# Patient Record
Sex: FEMALE | Race: WHITE | HISPANIC OR LATINO | Employment: FULL TIME | URBAN - METROPOLITAN AREA
[De-identification: names, ages, dates, MRNs, and addresses within clinical notes are randomized per-mention and may not be internally consistent; named-entity substitution may affect disease eponyms.]

---

## 2023-10-23 ENCOUNTER — LAB (OUTPATIENT)
Dept: LAB | Facility: HOSPITAL | Age: 23
End: 2023-10-23
Payer: COMMERCIAL

## 2023-10-23 ENCOUNTER — TRANSCRIBE ORDERS (OUTPATIENT)
Dept: LAB | Facility: HOSPITAL | Age: 23
End: 2023-10-23
Payer: COMMERCIAL

## 2023-10-23 DIAGNOSIS — Z34.81 PRENATAL CARE, SUBSEQUENT PREGNANCY, FIRST TRIMESTER: ICD-10-CM

## 2023-10-23 DIAGNOSIS — Z3A.12 12 WEEKS GESTATION OF PREGNANCY: ICD-10-CM

## 2023-10-23 DIAGNOSIS — Z3A.12 12 WEEKS GESTATION OF PREGNANCY: Primary | ICD-10-CM

## 2023-10-23 LAB
ABO GROUP BLD: NORMAL
AMPHET+METHAMPHET UR QL: NEGATIVE
AMPHETAMINES UR QL: NEGATIVE
BARBITURATES UR QL SCN: NEGATIVE
BENZODIAZ UR QL SCN: NEGATIVE
BILIRUB UR QL STRIP: NEGATIVE
BLD GP AB SCN SERPL QL: NEGATIVE
BUPRENORPHINE SERPL-MCNC: NEGATIVE NG/ML
CANNABINOIDS SERPL QL: NEGATIVE
CLARITY UR: CLEAR
COCAINE UR QL: NEGATIVE
COLOR UR: YELLOW
DEPRECATED RDW RBC AUTO: 47.8 FL (ref 37–54)
ERYTHROCYTE [DISTWIDTH] IN BLOOD BY AUTOMATED COUNT: 14.1 % (ref 12.3–15.4)
FENTANYL UR-MCNC: NEGATIVE NG/ML
GLUCOSE UR STRIP-MCNC: NEGATIVE MG/DL
HCT VFR BLD AUTO: 41.3 % (ref 34–46.6)
HGB BLD-MCNC: 13.8 G/DL (ref 12–15.9)
HGB UR QL STRIP.AUTO: NEGATIVE
HOLD SPECIMEN: NORMAL
KETONES UR QL STRIP: NEGATIVE
LEUKOCYTE ESTERASE UR QL STRIP.AUTO: NEGATIVE
MCH RBC QN AUTO: 31.1 PG (ref 26.6–33)
MCHC RBC AUTO-ENTMCNC: 33.4 G/DL (ref 31.5–35.7)
MCV RBC AUTO: 93 FL (ref 79–97)
METHADONE UR QL SCN: NEGATIVE
NITRITE UR QL STRIP: NEGATIVE
OPIATES UR QL: NEGATIVE
OXYCODONE UR QL SCN: NEGATIVE
PCP UR QL SCN: NEGATIVE
PH UR STRIP.AUTO: 6.5 [PH] (ref 5–8)
PLATELET # BLD AUTO: 250 10*3/MM3 (ref 140–450)
PMV BLD AUTO: 10 FL (ref 6–12)
PROPOXYPH UR QL: NEGATIVE
PROT UR QL STRIP: NEGATIVE
RBC # BLD AUTO: 4.44 10*6/MM3 (ref 3.77–5.28)
RH BLD: POSITIVE
SP GR UR STRIP: 1.03 (ref 1–1.03)
TRICYCLICS UR QL SCN: NEGATIVE
UROBILINOGEN UR QL STRIP: NORMAL
WBC NRBC COR # BLD: 7.58 10*3/MM3 (ref 3.4–10.8)

## 2023-10-23 PROCEDURE — 86850 RBC ANTIBODY SCREEN: CPT

## 2023-10-23 PROCEDURE — 36415 COLL VENOUS BLD VENIPUNCTURE: CPT

## 2023-10-23 PROCEDURE — 87086 URINE CULTURE/COLONY COUNT: CPT

## 2023-10-23 PROCEDURE — 86787 VARICELLA-ZOSTER ANTIBODY: CPT

## 2023-10-23 PROCEDURE — 86780 TREPONEMA PALLIDUM: CPT

## 2023-10-23 PROCEDURE — 80307 DRUG TEST PRSMV CHEM ANLYZR: CPT

## 2023-10-23 PROCEDURE — 86762 RUBELLA ANTIBODY: CPT

## 2023-10-23 PROCEDURE — 87491 CHLMYD TRACH DNA AMP PROBE: CPT

## 2023-10-23 PROCEDURE — 86900 BLOOD TYPING SEROLOGIC ABO: CPT

## 2023-10-23 PROCEDURE — 86803 HEPATITIS C AB TEST: CPT

## 2023-10-23 PROCEDURE — 87340 HEPATITIS B SURFACE AG IA: CPT

## 2023-10-23 PROCEDURE — 85027 COMPLETE CBC AUTOMATED: CPT | Performed by: ADVANCED PRACTICE MIDWIFE

## 2023-10-23 PROCEDURE — G0432 EIA HIV-1/HIV-2 SCREEN: HCPCS

## 2023-10-23 PROCEDURE — 87591 N.GONORRHOEAE DNA AMP PROB: CPT

## 2023-10-23 PROCEDURE — 81003 URINALYSIS AUTO W/O SCOPE: CPT

## 2023-10-23 PROCEDURE — 86901 BLOOD TYPING SEROLOGIC RH(D): CPT

## 2023-10-24 LAB
BACTERIA SPEC AEROBE CULT: NO GROWTH
HBV SURFACE AG SERPL QL IA: NORMAL
HCV AB SER DONR QL: NORMAL
HIV 1+2 AB+HIV1 P24 AG SERPL QL IA: NORMAL

## 2023-10-25 LAB
RUBV IGG SERPL IA-ACNC: 2.04 INDEX
TREPONEMA PALLIDUM IGG+IGM AB [PRESENCE] IN SERUM OR PLASMA BY IMMUNOASSAY: NON REACTIVE
VZV IGG SER IA-ACNC: 299 INDEX

## 2023-10-28 LAB
C TRACH RRNA SPEC QL NAA+PROBE: NEGATIVE
N GONORRHOEA RRNA SPEC QL NAA+PROBE: NEGATIVE

## 2024-02-12 ENCOUNTER — TRANSCRIBE ORDERS (OUTPATIENT)
Dept: LAB | Facility: HOSPITAL | Age: 24
End: 2024-02-12
Payer: COMMERCIAL

## 2024-02-12 ENCOUNTER — LAB (OUTPATIENT)
Dept: LAB | Facility: HOSPITAL | Age: 24
End: 2024-02-12
Payer: COMMERCIAL

## 2024-02-12 DIAGNOSIS — Z34.83 PRENATAL CARE, SUBSEQUENT PREGNANCY, THIRD TRIMESTER: ICD-10-CM

## 2024-02-12 DIAGNOSIS — Z3A.28 28 WEEKS GESTATION OF PREGNANCY: ICD-10-CM

## 2024-02-12 DIAGNOSIS — Z3A.28 28 WEEKS GESTATION OF PREGNANCY: Primary | ICD-10-CM

## 2024-02-12 LAB
BLD GP AB SCN SERPL QL: NEGATIVE
DEPRECATED RDW RBC AUTO: 43.6 FL (ref 37–54)
ERYTHROCYTE [DISTWIDTH] IN BLOOD BY AUTOMATED COUNT: 13.2 % (ref 12.3–15.4)
GLUCOSE 1H P 100 G GLC PO SERPL-MCNC: 106 MG/DL (ref 65–139)
HCT VFR BLD AUTO: 35.8 % (ref 34–46.6)
HGB BLD-MCNC: 11.6 G/DL (ref 12–15.9)
MCH RBC QN AUTO: 29.4 PG (ref 26.6–33)
MCHC RBC AUTO-ENTMCNC: 32.4 G/DL (ref 31.5–35.7)
MCV RBC AUTO: 90.6 FL (ref 79–97)
PLATELET # BLD AUTO: 318 10*3/MM3 (ref 140–450)
PMV BLD AUTO: 9.3 FL (ref 6–12)
RBC # BLD AUTO: 3.95 10*6/MM3 (ref 3.77–5.28)
WBC NRBC COR # BLD AUTO: 8.21 10*3/MM3 (ref 3.4–10.8)

## 2024-02-12 PROCEDURE — 82948 REAGENT STRIP/BLOOD GLUCOSE: CPT | Performed by: ADVANCED PRACTICE MIDWIFE

## 2024-02-12 PROCEDURE — 36415 COLL VENOUS BLD VENIPUNCTURE: CPT

## 2024-02-12 PROCEDURE — 82950 GLUCOSE TEST: CPT

## 2024-02-12 PROCEDURE — 85027 COMPLETE CBC AUTOMATED: CPT | Performed by: ADVANCED PRACTICE MIDWIFE

## 2024-02-12 PROCEDURE — 86850 RBC ANTIBODY SCREEN: CPT

## 2024-03-29 ENCOUNTER — HOSPITAL ENCOUNTER (OUTPATIENT)
Facility: HOSPITAL | Age: 24
Discharge: HOME | End: 2024-03-29
Attending: ADVANCED PRACTICE MIDWIFE | Admitting: OBSTETRICS & GYNECOLOGY
Payer: COMMERCIAL

## 2024-03-29 VITALS
DIASTOLIC BLOOD PRESSURE: 62 MMHG | SYSTOLIC BLOOD PRESSURE: 100 MMHG | HEART RATE: 88 BPM | TEMPERATURE: 98.5 F | HEIGHT: 61 IN | RESPIRATION RATE: 18 BRPM | WEIGHT: 138 LBS | BODY MASS INDEX: 26.06 KG/M2

## 2024-03-29 PROBLEM — O47.00 PRETERM UTERINE CONTRACTIONS, ANTEPARTUM: Status: ACTIVE | Noted: 2024-03-29

## 2024-03-29 PROBLEM — N93.9 VAGINAL BLEEDING: Status: ACTIVE | Noted: 2024-03-29

## 2024-03-29 LAB
ALP SERPL-CCNC: 164 U/L (ref 39–117)
ALT SERPL W P-5'-P-CCNC: 7 U/L (ref 1–33)
APTT PPP: 28.3 SECONDS (ref 22–39)
AST SERPL-CCNC: 16 U/L (ref 1–32)
BILIRUB SERPL-MCNC: 0.7 MG/DL (ref 0–1.2)
BILIRUB UR QL STRIP: NEGATIVE
CLARITY UR: CLEAR
COLOR UR: YELLOW
CREAT SERPL-MCNC: 0.43 MG/DL (ref 0.57–1)
DEPRECATED RDW RBC AUTO: 44 FL (ref 37–54)
ERYTHROCYTE [DISTWIDTH] IN BLOOD BY AUTOMATED COUNT: 15.3 % (ref 12.3–15.4)
FIBRINOGEN PPP-MCNC: 400 MG/DL (ref 203–470)
GLUCOSE UR STRIP-MCNC: NEGATIVE MG/DL
HCT VFR BLD AUTO: 35.3 % (ref 34–46.6)
HGB BLD-MCNC: 11 G/DL (ref 12–15.9)
HGB F BLD QL KLEIH BETKE: NORMAL
HGB UR QL STRIP.AUTO: NEGATIVE
INR PPP: 1.11 (ref 0.89–1.12)
KETONES UR QL STRIP: NEGATIVE
LDH SERPL-CCNC: 221 U/L (ref 135–214)
LEUKOCYTE ESTERASE UR QL STRIP.AUTO: NEGATIVE
MCH RBC QN AUTO: 24.8 PG (ref 26.6–33)
MCHC RBC AUTO-ENTMCNC: 31.2 G/DL (ref 31.5–35.7)
MCV RBC AUTO: 79.5 FL (ref 79–97)
NITRITE UR QL STRIP: NEGATIVE
PH UR STRIP.AUTO: 7 [PH] (ref 5–8)
PLATELET # BLD AUTO: 244 10*3/MM3 (ref 140–450)
PMV BLD AUTO: 9.4 FL (ref 6–12)
PROT UR QL STRIP: NEGATIVE
PROTHROMBIN TIME: 14.4 SECONDS (ref 12.2–14.5)
RBC # BLD AUTO: 4.44 10*6/MM3 (ref 3.77–5.28)
SP GR UR STRIP: <=1.005 (ref 1–1.03)
URATE SERPL-MCNC: 3.9 MG/DL (ref 2.4–5.7)
UROBILINOGEN UR QL STRIP: NORMAL
WBC NRBC COR # BLD AUTO: 11.01 10*3/MM3 (ref 3.4–10.8)

## 2024-03-29 PROCEDURE — 83615 LACTATE (LD) (LDH) ENZYME: CPT | Performed by: OBSTETRICS & GYNECOLOGY

## 2024-03-29 PROCEDURE — 82565 ASSAY OF CREATININE: CPT | Performed by: OBSTETRICS & GYNECOLOGY

## 2024-03-29 PROCEDURE — 85027 COMPLETE CBC AUTOMATED: CPT | Performed by: OBSTETRICS & GYNECOLOGY

## 2024-03-29 PROCEDURE — 84075 ASSAY ALKALINE PHOSPHATASE: CPT | Performed by: OBSTETRICS & GYNECOLOGY

## 2024-03-29 PROCEDURE — 81003 URINALYSIS AUTO W/O SCOPE: CPT | Performed by: OBSTETRICS & GYNECOLOGY

## 2024-03-29 PROCEDURE — 36415 COLL VENOUS BLD VENIPUNCTURE: CPT | Performed by: OBSTETRICS & GYNECOLOGY

## 2024-03-29 PROCEDURE — 84550 ASSAY OF BLOOD/URIC ACID: CPT | Performed by: OBSTETRICS & GYNECOLOGY

## 2024-03-29 PROCEDURE — 85384 FIBRINOGEN ACTIVITY: CPT | Performed by: OBSTETRICS & GYNECOLOGY

## 2024-03-29 PROCEDURE — 85730 THROMBOPLASTIN TIME PARTIAL: CPT | Performed by: OBSTETRICS & GYNECOLOGY

## 2024-03-29 PROCEDURE — 82247 BILIRUBIN TOTAL: CPT | Performed by: OBSTETRICS & GYNECOLOGY

## 2024-03-29 PROCEDURE — 84460 ALANINE AMINO (ALT) (SGPT): CPT | Performed by: OBSTETRICS & GYNECOLOGY

## 2024-03-29 PROCEDURE — 85610 PROTHROMBIN TIME: CPT | Performed by: OBSTETRICS & GYNECOLOGY

## 2024-03-29 PROCEDURE — 85460 HEMOGLOBIN FETAL: CPT | Performed by: OBSTETRICS & GYNECOLOGY

## 2024-03-29 PROCEDURE — 25810000003 LACTATED RINGERS SOLUTION: Performed by: OBSTETRICS & GYNECOLOGY

## 2024-03-29 PROCEDURE — 84450 TRANSFERASE (AST) (SGOT): CPT | Performed by: OBSTETRICS & GYNECOLOGY

## 2024-03-29 PROCEDURE — G0463 HOSPITAL OUTPT CLINIC VISIT: HCPCS

## 2024-03-29 RX ADMIN — SODIUM CHLORIDE, POTASSIUM CHLORIDE, SODIUM LACTATE AND CALCIUM CHLORIDE 1000 ML: 600; 310; 30; 20 INJECTION, SOLUTION INTRAVENOUS at 13:37

## 2024-03-29 NOTE — H&P
She has seen ortho - dr. Arnaldo Elliott regarding knee  Sees kaleb for injections to spine? Please clarify - is this injection for osteoporosis? ?? Triage H&P    Patient Name: Gianna Harris  : 2000  MRN: [id number]  Date of Service: 3/29/2024  Referring Provider: Layla Head     ID: 23 y.o.  at 34w6d    Chief complaint: vaginal bleeding    HPI:  Pt with mild cramps and contractions last night with sharp RLQ pain when rolling over and woke up this morning with 5x5 cm brb spot on her pajamas consistency of menstrual blood not watery fluid.  Pt has not eaten since 9 pm last night  No n/v/d no f/c no sob/cp\    No current abdominal pain      Pregnancy course significant for:    Patient Active Problem List    Diagnosis     *Vaginal bleeding [N93.9]      uterine contractions, antepartum [O47.00]        Her prenatal care is benign.  Her previous obstetric/gynecological history is noted for  VAVD at term .    The following portions of the patients history were reviewed and updated as appropriate: current medications, allergies, past medical history, past surgical history, past family history, past social history, and problem list.       REVIEW OF SYSTEMS  vaginal bleeding  Patient reports good fetal movement.  She denies any leakage of fluid, or contractions.  She denies headache, visual changes, or right upper quadrant pain.  All other systems were reviewed and were negative.    Prenatal Labs  Lab Results   Component Value Date    HGB 11.0 (L) 2024    HEPBSAG Non-Reactive 10/23/2023    ABO O 10/23/2023    RH Positive 10/23/2023    ABSCRN Negative 2024    [id number] Non-Reactive 10/23/2023    HEPCVIRUSABY Non-Reactive 10/23/2023    URINECX No growth 10/23/2023       OB HISTORY    OB History          2    Para   1    Term   1            AB        Living   1         SAB        IAB        Ectopic        Molar        Multiple        Live Births   1              PAST MEDICAL HISTORY    Past Medical History:   Diagnosis Date    Herpes 2019     PAST SURGICAL HISTORY     has no past surgical history on file.  CURRENT  MEDICATIONS    No current facility-administered medications on file prior to encounter.     No current outpatient medications on file prior to encounter.     ALLERGIES    Patient has no known allergies.  SOCIAL HISTORY    Social History     Tobacco Use   Smoking Status Never    Passive exposure: Never   Smokeless Tobacco Never     Social History     Substance and Sexual Activity   Alcohol Use Not Currently     Social History     Substance and Sexual Activity   Drug Use Never     FAMILY HISTORY  The patient has a family history of    PHYSICAL EXAMINATION    Vital Signs Range for the last 24 hours  Temperature:     Temp Source: Temp src: Oral   BP: BP: (100)/(62) 100/62   Pulse: Heart Rate:  [88] 88   Respirations: Resp:  [18] 18   Weight: 62.6 kg (138 lb)     Constitutional:  Well developed, well nourished, no acute distress, well-groomed.  HEENT: Grossly normal.  Respiratory:  Unlabored, normal breath sounds.   Cardiovascular:  Normal rate and rhythm    Abdomen:  Soft, gravid, nontender.  Uterus: Soft, nontender. Fundus appropriate for dates.  Neurologic:  Alert & oriented x 4,  no focal deficits noted.   Psychiatric:  Speech and behavior appropriate.   Extremities: no cyanosis, clubbing or edema, no evidence of DVT.      External Fetal Monitoring:  Fetal monitoring: indication vaginal bleeding , onset 1216 , offset 1348 , baseline 150 , moderate variability , multiple accels (15 X 15), no decels, regular contractions, interpretation category 1    Fetal Heart Rate Assessment   Method:     Beats/min: Fetal HR (beats/min): 135   Baseline:     Varibility: Fetal HR Variability: moderate (amplitude range 6 to 25 bpm)   Accels: Fetal HR Accelerations: greater than/equal to 15 bpm, lasting at least 15 seconds   Decels: Fetal HR Decelerations: absent   Tracing Category:       Uterine Assessment   Method: Method: external tocotransducer   Frequency (min): Contraction Frequency (Minutes): 3-9   Ctx Count in 10 min:      Duration:     Intensity: Contraction Intensity: no contractions   Intensity by IUPC:     Resting Tone: Uterine Resting Tone: soft by palpation   Resting Tone by IUPC:     [place] Units:       Cervix: Exam by: Method: sterile exam per physician   Dilation:  3   Effacement: Cervical Effacement: 30%   Station:  -3   vertex     Ultrasound:  Normal anatomy scan per pt    Labs:      Lab Results (last 24 hours)       Procedure Component Value Units Date/Time    Preeclampsia Panel [[id number]]  (Abnormal) Collected: 03/29/24 1337    Specimen: Blood Updated: 03/29/24 1423     Alkaline Phosphatase 164 U/L      ALT (SGPT) 7 U/L      AST (SGOT) 16 U/L      Creatinine 0.43 mg/dL      Total Bilirubin 0.7 mg/dL       U/L      Uric Acid 3.9 mg/dL     Fibrinogen [[id number]]  (Normal) Collected: 03/29/24 1252    Specimen: Blood from Arm, Left Updated: 03/29/24 1335     Fibrinogen 400 mg/dL     aPTT [[id number]]  (Normal) Collected: 03/29/24 1252    Specimen: Blood from Arm, Left Updated: 03/29/24 1335     PTT 28.3 seconds     Narrative:      PTT = The equivalent PTT values for the therapeutic range of heparin levels at 0.3 to 0.5 U/ml are 60 to 70 seconds.    Protime-INR [[id number]]  (Normal) Collected: 03/29/24 1252    Specimen: Blood from Arm, Left Updated: 03/29/24 1335     Protime 14.4 Seconds      INR 1.11    Urinalysis With Microscopic If Indicated (No Culture) - Urine, Clean Catch [[id number]]  (Normal) Collected: 03/29/24 1253    Specimen: Urine, Clean Catch Updated: 03/29/24 1331     Color, UA Yellow     Appearance, UA Clear     pH, UA 7.0     Specific Gravity, UA <=1.005     Glucose, UA Negative     Ketones, UA Negative     Bilirubin, UA Negative     Blood, UA Negative     Protein, UA Negative     Leuk Esterase, UA Negative     Nitrite, UA Negative     Urobilinogen, UA 0.2 E.U./dL    Narrative:      Urine microscopic not indicated.    CBC (No Diff) [[id number]]  (Abnormal) Collected: 03/29/24 1252    Specimen: Blood  from Arm, Left Updated: 24 1307     WBC 11.01 10*3/mm3      RBC 4.44 10*6/mm3      Hemoglobin 11.0 g/dL      Hematocrit 35.3 %      MCV 79.5 fL      MCH 24.8 pg      MCHC 31.2 g/dL      RDW 15.3 %      RDW-SD 44.0 fl      MPV 9.4 fL      Platelets 244 10*3/mm3             ASSESSMENT/PLAN:  23 y.o. female  at 34w6d with Vaginal bleeding.    Patient Active Problem List    Diagnosis     *Vaginal bleeding [N93.9]      uterine contractions, antepartum [O47.00]    Admit for 23 hour obs for PTL vs abruption as reason for bleeding  [facility]   Labs  Ava Head and Crow aware  Approximately 25 minutes minutes floor time was spent in care of this patient, of which greater than 50% was spent in face-to-face consultation and coordination of care.    Ana M Samson MD  3/29/2024  15:05 EDT    Labs reviewed and are normal  NST continues to be reactive and reassuring  CTX are 3/10 in pain and have spread out since [facility], no active bleeding    Ino plans to see patient later this afternoon for disposition

## 2024-03-29 NOTE — H&P
Admit H&P    Patient Name: Gianna Harris  : 2000  MRN: [id number]  Date of Service: 3/29/2024  Referring Provider: Layla Head     ID: 23 y.o.  at 34w6d    Chief complaint: vaginal bleeding    HPI:  Pt had cramps and mild contractions last night and moving over in bed she felt a very sharp pain in her RLQ last night that has gone away but she woke up with blood stained pajama pants with a photo showing bright red blood in a patch about 5 x 5 cm wide.   She had some additional bleeding when she initially stood up but has not had any further bleeding.  She last ate at 9 pm.  Pt states that the blood was the consistency of menstrual blood not watery fluid.      Pt denies any history of trauma in the past month, no MVA, no fall  Pregnancy course significant for:      Her prenatal care is benign.  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    The following portions of the patients history were reviewed and updated as appropriate: current medications, allergies, past medical history, past surgical history, past family history, past social history, and problem list.       REVIEW OF SYSTEMS  abdominal pain in the right lower quadrant and vaginal bleeding  Patient reports good fetal movement.  She denies  leakage of fluid  She denies headache, visual changes, or right upper quadrant pain.  All other systems were reviewed and were negative.  No n/v/d, no sob/cp, no f/c    Prenatal Labs  Lab Results   Component Value Date    HGB 11.0 (L) 2024    HEPBSAG Non-Reactive 10/23/2023    ABO O 10/23/2023    RH Positive 10/23/2023    ABSCRN Negative 2024    [id number] Non-Reactive 10/23/2023    HEPCVIRUSABY Non-Reactive 10/23/2023    URINECX No growth 10/23/2023       OB HISTORY    OB History          2    Para   1    Term   1            AB        Living   1         SAB        IAB        Ectopic         Molar        Multiple        Live Births   1              PAST MEDICAL HISTORY    Past Medical History:   Diagnosis Date    Herpes 2019     PAST SURGICAL HISTORY     has no past surgical history on file.  CURRENT MEDICATIONS    No current facility-administered medications on file prior to encounter.     No current outpatient medications on file prior to encounter.     ALLERGIES    Patient has no known allergies.  SOCIAL HISTORY    Social History     Tobacco Use   Smoking Status Never    Passive exposure: Never   Smokeless Tobacco Never     Social History     Substance and Sexual Activity   Alcohol Use Not Currently     Social History     Substance and Sexual Activity   Drug Use Never     FAMILY HISTORY  The patient has a family history of    PHYSICAL EXAMINATION    Vital Signs Range for the last 24 hours  Temperature:     Temp Source: Temp src: Oral   BP:     Pulse:     Respirations: Resp:  [18] 18   Weight: 62.6 kg (138 lb)     Constitutional:  Well developed, well nourished, no acute distress, well-groomed.  HEENT: Grossly normal.  Respiratory: Unlabored, normal breath sounds.   Cardiovascular:  Normal rate and rhythm,  Abdomen:  Soft, gravid, nontender.  Uterus: Soft, nontender. Fundus appropriate for dates.  Neurologic:  Alert & oriented x 4,  no focal deficits noted.   Psychiatric:  Speech and behavior appropriate.   Extremities: no cyanosis, clubbing or edema, no evidence of DVT.      External Fetal Monitoring:  Fetal monitoring: indication vaginal bleeding , onset 1216 , offset 1328 , baseline 150 , moderate variability , multiple accels (15 X 15), no decels, regular contractions, interpretation category 1    Fetal Heart Rate Assessment   Method:     Beats/min:     Baseline:     Varibility:     Accels:     Decels:     Tracing Category:       Uterine Assessment   Method: Method: palpation, per patient report   Frequency (min):     Ctx Count in 10 min:     Duration:     Intensity: Contraction Intensity: no  contractions   Intensity by IUPC:     Resting Tone: Uterine Resting Tone: soft by palpation   Resting Tone by IUPC:     [place] Units:       Cervix: Exam by: Method: sterile exam per physician   Dilation:  3   Effacement: Cervical Effacement: 30%   Station:  -3 vertex     Ultrasound:  Per report last scan was at 20 weeks normal anatomy scan    Labs:      Lab Results (last 24 hours)       Procedure Component Value Units Date/Time    Urinalysis With Microscopic If Indicated (No Culture) - Urine, Clean Catch [[id number]] Collected: 24 1253    Specimen: Urine, Clean Catch Updated: 24 1314    CBC (No Diff) [[id number]]  (Abnormal) Collected: 24 1252    Specimen: Blood from Arm, Left Updated: 24 1307     WBC 11.01 10*3/mm3      RBC 4.44 10*6/mm3      Hemoglobin 11.0 g/dL      Hematocrit 35.3 %      MCV 79.5 fL      MCH 24.8 pg      MCHC 31.2 g/dL      RDW 15.3 %      RDW-SD 44.0 fl      MPV 9.4 fL      Platelets 244 10*3/mm3     Protime-INR [[id number]] Collected: 24 1252    Specimen: Blood from Arm, Left Updated: 24 1307    Fibrinogen [[id number]] Collected: 24 1252    Specimen: Blood from Arm, Left Updated: 24 1301    aPTT [[id number]] Collected: 24 1252    Specimen: Blood from Arm, Left Updated: 24 1301            ASSESSMENT/PLAN:  23 y.o. female  at 34w6d with Vaginal bleeding, PTL vs. abruption    Patient Active Problem List    Diagnosis     *Vaginal bleeding [N93.9]      uterine contractions, antepartum [O47.00]      IV[facility]  Check labs  Monitor for signs of abruption     Approximately 25 minutes minutes floor time was spent in care of this patient, of which greater than 50% was spent in face-to-face consultation and coordination of care.    Ana M Samson MD  3/29/2024  13:28 EDT

## 2024-03-29 NOTE — LETTER
March 29, 2024     Patient: Gianna Harris   YOB: 2000   Date of Visit: 3/29/2024       To Whom It May Concern:    It is my medical opinion that Gianna Harris may return to work on 4/8/24 as it medically necessary to be on modified bedrest related to her pregnancy.  .           Sincerely,        Dr. Ana M Samson    CC:   No Recipients

## 2024-04-08 LAB — EXTERNAL GROUP B STREP ANTIGEN: NEGATIVE

## 2024-05-01 ENCOUNTER — ANESTHESIA EVENT (OUTPATIENT)
Dept: LABOR AND DELIVERY | Facility: HOSPITAL | Age: 24
End: 2024-05-01
Payer: COMMERCIAL

## 2024-05-01 ENCOUNTER — ANESTHESIA (OUTPATIENT)
Dept: LABOR AND DELIVERY | Facility: HOSPITAL | Age: 24
End: 2024-05-01
Payer: COMMERCIAL

## 2024-05-01 ENCOUNTER — HOSPITAL ENCOUNTER (INPATIENT)
Facility: HOSPITAL | Age: 24
LOS: 1 days | Discharge: HOME | End: 2024-05-02
Attending: OBSTETRICS & GYNECOLOGY | Admitting: OBSTETRICS & GYNECOLOGY
Payer: COMMERCIAL

## 2024-05-01 PROBLEM — Z34.90 TERM PREGNANCY: Status: ACTIVE | Noted: 2024-05-01

## 2024-05-01 PROBLEM — Z34.90 PREGNANCY: Status: ACTIVE | Noted: 2024-05-01

## 2024-05-01 PROBLEM — Z34.90 PREGNANCY: Status: RESOLVED | Noted: 2024-05-01 | Resolved: 2024-05-01

## 2024-05-01 LAB
ABO GROUP BLD: NORMAL
ALP SERPL-CCNC: 256 U/L (ref 39–117)
ALT SERPL W P-5'-P-CCNC: 6 U/L (ref 1–33)
AST SERPL-CCNC: 18 U/L (ref 1–32)
BILIRUB SERPL-MCNC: 0.6 MG/DL (ref 0–1.2)
BLD GP AB SCN SERPL QL: NEGATIVE
CREAT SERPL-MCNC: 0.5 MG/DL (ref 0.57–1)
DEPRECATED RDW RBC AUTO: 45.5 FL (ref 37–54)
ERYTHROCYTE [DISTWIDTH] IN BLOOD BY AUTOMATED COUNT: 17.3 % (ref 12.3–15.4)
HCT VFR BLD AUTO: 35 % (ref 34–46.6)
HGB BLD-MCNC: 10.6 G/DL (ref 12–15.9)
LDH SERPL-CCNC: 228 U/L (ref 135–214)
MCH RBC QN AUTO: 22.7 PG (ref 26.6–33)
MCHC RBC AUTO-ENTMCNC: 30.3 G/DL (ref 31.5–35.7)
MCV RBC AUTO: 75.1 FL (ref 79–97)
PLATELET # BLD AUTO: 239 10*3/MM3 (ref 140–450)
PMV BLD AUTO: 9.8 FL (ref 6–12)
RBC # BLD AUTO: 4.66 10*6/MM3 (ref 3.77–5.28)
RH BLD: POSITIVE
T PALLIDUM IGG SER QL: NORMAL
T&S EXPIRATION DATE: NORMAL
URATE SERPL-MCNC: 4.6 MG/DL (ref 2.4–5.7)
WBC NRBC COR # BLD AUTO: 9.7 10*3/MM3 (ref 3.4–10.8)

## 2024-05-01 PROCEDURE — C1755 CATHETER, INTRASPINAL: HCPCS | Performed by: ANESTHESIOLOGY

## 2024-05-01 PROCEDURE — 83615 LACTATE (LD) (LDH) ENZYME: CPT | Performed by: OBSTETRICS & GYNECOLOGY

## 2024-05-01 PROCEDURE — 84075 ASSAY ALKALINE PHOSPHATASE: CPT | Performed by: OBSTETRICS & GYNECOLOGY

## 2024-05-01 PROCEDURE — 25810000003 LACTATED RINGERS SOLUTION: Performed by: ANESTHESIOLOGY

## 2024-05-01 PROCEDURE — 25010000002 FENTANYL CITRATE (PF) 50 MCG/ML SOLUTION: Performed by: ANESTHESIOLOGY

## 2024-05-01 PROCEDURE — 84450 TRANSFERASE (AST) (SGOT): CPT | Performed by: OBSTETRICS & GYNECOLOGY

## 2024-05-01 PROCEDURE — 59025 FETAL NON-STRESS TEST: CPT

## 2024-05-01 PROCEDURE — 85027 COMPLETE CBC AUTOMATED: CPT | Performed by: OBSTETRICS & GYNECOLOGY

## 2024-05-01 PROCEDURE — 25010000002 ROPIVACAINE PER 1 MG: Performed by: ANESTHESIOLOGY

## 2024-05-01 PROCEDURE — 84550 ASSAY OF BLOOD/URIC ACID: CPT | Performed by: OBSTETRICS & GYNECOLOGY

## 2024-05-01 PROCEDURE — 25810000003 SODIUM CHLORIDE 0.9 % SOLUTION

## 2024-05-01 PROCEDURE — 86850 RBC ANTIBODY SCREEN: CPT | Performed by: OBSTETRICS & GYNECOLOGY

## 2024-05-01 PROCEDURE — 86900 BLOOD TYPING SEROLOGIC ABO: CPT | Performed by: OBSTETRICS & GYNECOLOGY

## 2024-05-01 PROCEDURE — 51702 INSERT TEMP BLADDER CATH: CPT

## 2024-05-01 PROCEDURE — 84460 ALANINE AMINO (ALT) (SGPT): CPT | Performed by: OBSTETRICS & GYNECOLOGY

## 2024-05-01 PROCEDURE — 25010000002 METHYLERGONOVINE MALEATE PER 0.2 MG

## 2024-05-01 PROCEDURE — C1755 CATHETER, INTRASPINAL: HCPCS

## 2024-05-01 PROCEDURE — 86780 TREPONEMA PALLIDUM: CPT | Performed by: OBSTETRICS & GYNECOLOGY

## 2024-05-01 PROCEDURE — 0HQ9XZZ REPAIR PERINEUM SKIN, EXTERNAL APPROACH: ICD-10-PCS | Performed by: ADVANCED PRACTICE MIDWIFE

## 2024-05-01 PROCEDURE — 25010000002 OXYTOCIN PER 10 UNITS

## 2024-05-01 PROCEDURE — 82247 BILIRUBIN TOTAL: CPT | Performed by: OBSTETRICS & GYNECOLOGY

## 2024-05-01 PROCEDURE — 86901 BLOOD TYPING SEROLOGIC RH(D): CPT | Performed by: OBSTETRICS & GYNECOLOGY

## 2024-05-01 PROCEDURE — 25810000003 LACTATED RINGERS PER 1000 ML: Performed by: OBSTETRICS & GYNECOLOGY

## 2024-05-01 PROCEDURE — 82565 ASSAY OF CREATININE: CPT | Performed by: OBSTETRICS & GYNECOLOGY

## 2024-05-01 RX ORDER — ONDANSETRON 2 MG/ML
4 INJECTION INTRAMUSCULAR; INTRAVENOUS EVERY 6 HOURS PRN
Status: DISCONTINUED | OUTPATIENT
Start: 2024-05-01 | End: 2024-05-02 | Stop reason: HOSPADM

## 2024-05-01 RX ORDER — METHYLERGONOVINE MALEATE 0.2 MG/ML
200 INJECTION INTRAVENOUS ONCE AS NEEDED
Status: DISCONTINUED | OUTPATIENT
Start: 2024-05-01 | End: 2024-05-01 | Stop reason: HOSPADM

## 2024-05-01 RX ORDER — HYDROCORTISONE 25 MG/G
1 CREAM TOPICAL AS NEEDED
Status: DISCONTINUED | OUTPATIENT
Start: 2024-05-01 | End: 2024-05-02 | Stop reason: HOSPADM

## 2024-05-01 RX ORDER — METOCLOPRAMIDE HYDROCHLORIDE 5 MG/ML
10 INJECTION INTRAMUSCULAR; INTRAVENOUS ONCE AS NEEDED
Status: DISCONTINUED | OUTPATIENT
Start: 2024-05-01 | End: 2024-05-01 | Stop reason: HOSPADM

## 2024-05-01 RX ORDER — ACETAMINOPHEN 325 MG/1
650 TABLET ORAL EVERY 6 HOURS PRN
Status: DISCONTINUED | OUTPATIENT
Start: 2024-05-01 | End: 2024-05-02 | Stop reason: HOSPADM

## 2024-05-01 RX ORDER — EPHEDRINE SULFATE 5 MG/ML
10 INJECTION INTRAVENOUS
Status: DISCONTINUED | OUTPATIENT
Start: 2024-05-01 | End: 2024-05-01 | Stop reason: HOSPADM

## 2024-05-01 RX ORDER — FAMOTIDINE 10 MG/ML
20 INJECTION, SOLUTION INTRAVENOUS ONCE AS NEEDED
Status: DISCONTINUED | OUTPATIENT
Start: 2024-05-01 | End: 2024-05-01 | Stop reason: HOSPADM

## 2024-05-01 RX ORDER — MISOPROSTOL 200 UG/1
800 TABLET ORAL ONCE AS NEEDED
Status: DISCONTINUED | OUTPATIENT
Start: 2024-05-01 | End: 2024-05-01 | Stop reason: HOSPADM

## 2024-05-01 RX ORDER — SODIUM CHLORIDE, SODIUM LACTATE, POTASSIUM CHLORIDE, CALCIUM CHLORIDE 600; 310; 30; 20 MG/100ML; MG/100ML; MG/100ML; MG/100ML
125 INJECTION, SOLUTION INTRAVENOUS CONTINUOUS
Status: DISCONTINUED | OUTPATIENT
Start: 2024-05-01 | End: 2024-05-02 | Stop reason: HOSPADM

## 2024-05-01 RX ORDER — ONDANSETRON 4 MG/1
4 TABLET, ORALLY DISINTEGRATING ORAL EVERY 6 HOURS PRN
Status: DISCONTINUED | OUTPATIENT
Start: 2024-05-01 | End: 2024-05-01 | Stop reason: HOSPADM

## 2024-05-01 RX ORDER — SODIUM CHLORIDE 9 MG/ML
40 INJECTION, SOLUTION INTRAVENOUS AS NEEDED
Status: DISCONTINUED | OUTPATIENT
Start: 2024-05-01 | End: 2024-05-01 | Stop reason: HOSPADM

## 2024-05-01 RX ORDER — OXYTOCIN/0.9 % SODIUM CHLORIDE 30/500 ML
999 PLASTIC BAG, INJECTION (ML) INTRAVENOUS ONCE
Status: COMPLETED | OUTPATIENT
Start: 2024-05-01 | End: 2024-05-01

## 2024-05-01 RX ORDER — SODIUM CHLORIDE 0.9 % (FLUSH) 0.9 %
10 SYRINGE (ML) INJECTION EVERY 12 HOURS SCHEDULED
Status: DISCONTINUED | OUTPATIENT
Start: 2024-05-01 | End: 2024-05-01 | Stop reason: HOSPADM

## 2024-05-01 RX ORDER — SODIUM CHLORIDE 0.9 % (FLUSH) 0.9 %
10 SYRINGE (ML) INJECTION AS NEEDED
Status: DISCONTINUED | OUTPATIENT
Start: 2024-05-01 | End: 2024-05-01 | Stop reason: HOSPADM

## 2024-05-01 RX ORDER — DIPHENHYDRAMINE HYDROCHLORIDE 50 MG/ML
12.5 INJECTION INTRAMUSCULAR; INTRAVENOUS EVERY 8 HOURS PRN
Status: DISCONTINUED | OUTPATIENT
Start: 2024-05-01 | End: 2024-05-01 | Stop reason: HOSPADM

## 2024-05-01 RX ORDER — LIDOCAINE HYDROCHLORIDE AND EPINEPHRINE 15; 5 MG/ML; UG/ML
INJECTION, SOLUTION EPIDURAL AS NEEDED
Status: DISCONTINUED | OUTPATIENT
Start: 2024-05-01 | End: 2024-05-01 | Stop reason: SURG

## 2024-05-01 RX ORDER — CITRIC ACID/SODIUM CITRATE 334-500MG
30 SOLUTION, ORAL ORAL ONCE
Status: DISCONTINUED | OUTPATIENT
Start: 2024-05-01 | End: 2024-05-01 | Stop reason: HOSPADM

## 2024-05-01 RX ORDER — METHYLERGONOVINE MALEATE 0.2 MG/ML
INJECTION INTRAVENOUS
Status: COMPLETED
Start: 2024-05-01 | End: 2024-05-01

## 2024-05-01 RX ORDER — ROPIVACAINE HYDROCHLORIDE 5 MG/ML
INJECTION, SOLUTION EPIDURAL; INFILTRATION; PERINEURAL AS NEEDED
Status: DISCONTINUED | OUTPATIENT
Start: 2024-05-01 | End: 2024-05-01 | Stop reason: SURG

## 2024-05-01 RX ORDER — SODIUM CHLORIDE 0.9 % (FLUSH) 0.9 %
1-10 SYRINGE (ML) INJECTION AS NEEDED
Status: DISCONTINUED | OUTPATIENT
Start: 2024-05-01 | End: 2024-05-02 | Stop reason: HOSPADM

## 2024-05-01 RX ORDER — ROPIVACAINE HYDROCHLORIDE 2 MG/ML
15 INJECTION, SOLUTION EPIDURAL; INFILTRATION; PERINEURAL CONTINUOUS
Status: DISCONTINUED | OUTPATIENT
Start: 2024-05-01 | End: 2024-05-02 | Stop reason: HOSPADM

## 2024-05-01 RX ORDER — VALACYCLOVIR HYDROCHLORIDE 500 MG/1
500 TABLET, FILM COATED ORAL 2 TIMES DAILY
COMMUNITY
Start: 2024-04-29 | End: 2024-05-02 | Stop reason: HOSPADM

## 2024-05-01 RX ORDER — OXYTOCIN/0.9 % SODIUM CHLORIDE 30/500 ML
250 PLASTIC BAG, INJECTION (ML) INTRAVENOUS CONTINUOUS
Status: ACTIVE | OUTPATIENT
Start: 2024-05-01 | End: 2024-05-01

## 2024-05-01 RX ORDER — EPHEDRINE SULFATE 5 MG/ML
INJECTION INTRAVENOUS
Status: DISCONTINUED
Start: 2024-05-01 | End: 2024-05-02 | Stop reason: HOSPADM

## 2024-05-01 RX ORDER — ACETAMINOPHEN 325 MG/1
650 TABLET ORAL EVERY 4 HOURS PRN
Status: DISCONTINUED | OUTPATIENT
Start: 2024-05-01 | End: 2024-05-01 | Stop reason: HOSPADM

## 2024-05-01 RX ORDER — LIDOCAINE HYDROCHLORIDE 10 MG/ML
0.5 INJECTION, SOLUTION EPIDURAL; INFILTRATION; INTRACAUDAL; PERINEURAL ONCE AS NEEDED
Status: DISCONTINUED | OUTPATIENT
Start: 2024-05-01 | End: 2024-05-01 | Stop reason: HOSPADM

## 2024-05-01 RX ORDER — ONDANSETRON 2 MG/ML
4 INJECTION INTRAMUSCULAR; INTRAVENOUS ONCE AS NEEDED
Status: DISCONTINUED | OUTPATIENT
Start: 2024-05-01 | End: 2024-05-01

## 2024-05-01 RX ORDER — IBUPROFEN 600 MG/1
600 TABLET ORAL EVERY 6 HOURS PRN
Status: DISCONTINUED | OUTPATIENT
Start: 2024-05-01 | End: 2024-05-02 | Stop reason: HOSPADM

## 2024-05-01 RX ORDER — MAGNESIUM CARB/ALUMINUM HYDROX 105-160MG
30 TABLET,CHEWABLE ORAL ONCE
Status: DISCONTINUED | OUTPATIENT
Start: 2024-05-01 | End: 2024-05-01 | Stop reason: HOSPADM

## 2024-05-01 RX ORDER — DOCUSATE SODIUM 100 MG/1
100 CAPSULE, LIQUID FILLED ORAL 2 TIMES DAILY
Status: DISCONTINUED | OUTPATIENT
Start: 2024-05-01 | End: 2024-05-02 | Stop reason: HOSPADM

## 2024-05-01 RX ORDER — HYDROCODONE BITARTRATE AND ACETAMINOPHEN 5; 325 MG/1; MG/1
1 TABLET ORAL EVERY 4 HOURS PRN
Status: DISCONTINUED | OUTPATIENT
Start: 2024-05-01 | End: 2024-05-02 | Stop reason: HOSPADM

## 2024-05-01 RX ORDER — BISACODYL 10 MG
10 SUPPOSITORY, RECTAL RECTAL DAILY PRN
Status: DISCONTINUED | OUTPATIENT
Start: 2024-05-02 | End: 2024-05-02 | Stop reason: HOSPADM

## 2024-05-01 RX ORDER — CARBOPROST TROMETHAMINE 250 UG/ML
250 INJECTION, SOLUTION INTRAMUSCULAR
Status: DISCONTINUED | OUTPATIENT
Start: 2024-05-01 | End: 2024-05-01 | Stop reason: HOSPADM

## 2024-05-01 RX ORDER — OXYTOCIN/0.9 % SODIUM CHLORIDE 30/500 ML
125 PLASTIC BAG, INJECTION (ML) INTRAVENOUS ONCE AS NEEDED
Status: DISCONTINUED | OUTPATIENT
Start: 2024-05-01 | End: 2024-05-02 | Stop reason: HOSPADM

## 2024-05-01 RX ORDER — HYDROCODONE BITARTRATE AND ACETAMINOPHEN 10; 325 MG/1; MG/1
1 TABLET ORAL EVERY 4 HOURS PRN
Status: DISCONTINUED | OUTPATIENT
Start: 2024-05-01 | End: 2024-05-02 | Stop reason: HOSPADM

## 2024-05-01 RX ORDER — FENTANYL CITRATE 50 UG/ML
INJECTION, SOLUTION INTRAMUSCULAR; INTRAVENOUS AS NEEDED
Status: DISCONTINUED | OUTPATIENT
Start: 2024-05-01 | End: 2024-05-01 | Stop reason: SURG

## 2024-05-01 RX ORDER — OXYTOCIN/0.9 % SODIUM CHLORIDE 30/500 ML
PLASTIC BAG, INJECTION (ML) INTRAVENOUS
Status: COMPLETED
Start: 2024-05-01 | End: 2024-05-01

## 2024-05-01 RX ORDER — ONDANSETRON 2 MG/ML
4 INJECTION INTRAMUSCULAR; INTRAVENOUS EVERY 6 HOURS PRN
Status: DISCONTINUED | OUTPATIENT
Start: 2024-05-01 | End: 2024-05-01 | Stop reason: HOSPADM

## 2024-05-01 RX ADMIN — SODIUM CHLORIDE, POTASSIUM CHLORIDE, SODIUM LACTATE AND CALCIUM CHLORIDE 999 ML/HR: 600; 310; 30; 20 INJECTION, SOLUTION INTRAVENOUS at 02:52

## 2024-05-01 RX ADMIN — DOCUSATE SODIUM 100 MG: 100 CAPSULE, LIQUID FILLED ORAL at 08:53

## 2024-05-01 RX ADMIN — IBUPROFEN 600 MG: 600 TABLET, FILM COATED ORAL at 19:51

## 2024-05-01 RX ADMIN — ROPIVACAINE HYDROCHLORIDE 15 ML/HR: 2 INJECTION, SOLUTION EPIDURAL; INFILTRATION at 03:17

## 2024-05-01 RX ADMIN — METHYLERGONOVINE MALEATE 200 MCG: 0.2 INJECTION INTRAVENOUS at 05:11

## 2024-05-01 RX ADMIN — FENTANYL CITRATE 100 MCG: 50 INJECTION, SOLUTION INTRAMUSCULAR; INTRAVENOUS at 03:13

## 2024-05-01 RX ADMIN — Medication 999 ML/HR: at 05:08

## 2024-05-01 RX ADMIN — SODIUM CHLORIDE, POTASSIUM CHLORIDE, SODIUM LACTATE AND CALCIUM CHLORIDE 1000 ML: 600; 310; 30; 20 INJECTION, SOLUTION INTRAVENOUS at 02:50

## 2024-05-01 RX ADMIN — SODIUM CHLORIDE, POTASSIUM CHLORIDE, SODIUM LACTATE AND CALCIUM CHLORIDE 125 ML/HR: 600; 310; 30; 20 INJECTION, SOLUTION INTRAVENOUS at 03:39

## 2024-05-01 RX ADMIN — WITCH HAZEL: 500 SOLUTION RECTAL; TOPICAL at 08:53

## 2024-05-01 RX ADMIN — Medication: at 08:53

## 2024-05-01 RX ADMIN — ROPIVACAINE HYDROCHLORIDE 6.5 ML: 5 INJECTION, SOLUTION EPIDURAL; INFILTRATION; PERINEURAL at 03:13

## 2024-05-01 RX ADMIN — DOCUSATE SODIUM 100 MG: 100 CAPSULE, LIQUID FILLED ORAL at 19:51

## 2024-05-01 RX ADMIN — LIDOCAINE HYDROCHLORIDE AND EPINEPHRINE 2 ML: 15; 5 INJECTION, SOLUTION EPIDURAL at 03:10

## 2024-05-01 RX ADMIN — Medication 1 APPLICATION: at 08:53

## 2024-05-01 RX ADMIN — LIDOCAINE HYDROCHLORIDE AND EPINEPHRINE 3 ML: 15; 5 INJECTION, SOLUTION EPIDURAL at 03:08

## 2024-05-01 RX ADMIN — OXYTOCIN 999 ML/HR: 10 INJECTION INTRAVENOUS at 05:08

## 2024-05-01 NOTE — ANESTHESIA PREPROCEDURE EVALUATION
Anesthesia Evaluation     Patient summary reviewed and Nursing notes reviewed                Airway   Mallampati: II  TM distance: >3 FB  Neck ROM: full  No difficulty expected  Dental - normal exam     Pulmonary - negative pulmonary ROS   Cardiovascular - negative cardio ROS        Neuro/Psych- negative ROS  GI/Hepatic/Renal/Endo - negative ROS     Musculoskeletal (-) negative ROS    Abdominal    Substance History - negative use     OB/GYN    (+) Pregnant        Other - negative ROS                   Anesthesia Plan    ASA 2     epidural       Anesthetic plan, risks, benefits, and alternatives have been provided, discussed and informed consent has been obtained with: patient.    CODE STATUS:

## 2024-05-01 NOTE — H&P
Anticoagulation Summary  As of 7/24/2018    INR goal:   2.0-3.0   TTR:   35.3 % (3.2 mo)   Today's INR:      Warfarin maintenance plan:   2.5 mg (5 mg x 0.5) on Wed, Fri; 5 mg (5 mg x 1) all other days   Weekly warfarin total:   30 mg   Plan last modified:   Alexa Stewart (7/2/2018)   Next INR check:   8/21/2018   Target end date:   Indefinite    Indications    PAF (paroxysmal atrial fibrillation) (CMS-HCC) [I48.0]  Long term current use of anticoagulant therapy (Resolved) [Z79.01]  Status post mitral valve repair [Z98.890]             Anticoagulation Episode Summary     INR check location:       Preferred lab:       Send INR reminders to:       Comments:         Anticoagulation Care Providers     Provider Role Specialty Phone number    [facility] Responsible  [phone]        Anticoagulation Patient Findings  Patient Findings     Negatives:   Signs/symptoms of thrombosis, Signs/symptoms of bleeding, Laboratory test error suspected, Change in health, Change in alcohol use, Change in activity, Upcoming invasive procedure, Emergency department visit, Upcoming dental procedure, Missed doses, Extra doses, Change in medications, Change in diet/appetite, Hospital admission, Bruising, Other complaints        History of Present Illness: follow up appointment for chronic anticoagulation with the high risk medication, warfarin for atrial fibrillation.    Last INR was out of range, dosage adjusted: pt is now at goal. Pt is to continue with current warfarin dosing regimen.  Follow up in 4 weeks, to reduce risk of adverse events related to this high risk medication,  Warfarin.    Margareth Ewing, PharmD             [facility]  Obstetric History and Physical    Chief Complaint   Patient presents with    Laboring     Started around 1 am,        Subjective     Patient is a 23 y.o. female  currently at 39w4d, who presents for term labor  without ROM. On intake reports contractions, denies  leakage of fluid, denies  vaginal bleeding. reports fetal movement.    Her prenatal care is benign.  Her previous obstetric/gynecological history is noted for is non-contributory.    The following portions of the patients history were reviewed and updated as appropriate: past medical history, past surgical history, past family history, and past social history .       Prenatal Information:  Prenatal Results       Initial Prenatal Labs       Test Value Reference Range Date Time    Hemoglobin  13.8 g/dL 12.0 - 15.9 10/23/23 1443    Hematocrit  41.3 % 34.0 - 46.6 10/23/23 1443    Platelets  250 10*3/mm3 140 - 450 10/23/23 1443    Rubella IgG  2.04 index Immune >0.99 10/23/23 1443    Hepatitis B SAg  Non-Reactive  Non-Reactive 10/23/23 1443    Hepatitis C Ab  Non-Reactive  Non-Reactive 10/23/23 1443    RPR        T. Pallidum Ab         ABO  O   10/23/23 1443    Rh  Positive   10/23/23 1443    Antibody Screen  Negative   10/23/23 1443    HIV  Non-Reactive  Non-Reactive 10/23/23 1443    Urine Culture  No growth   10/23/23 1443    Gonorrhea  Negative  Negative 10/23/23 1443    Chlamydia  Negative  Negative 10/23/23 1443    TSH        HgB A1c         Varicella IgG  299 index Immune >165 10/23/23 1443    HgB Electrophoresis         Cystic fibrosis                   Fetal testing        Test Value Reference Range Date Time    NIPT        MSAFP        AFP-4                  2nd and 3rd Trimester       Test Value Reference Range Date Time    Hemoglobin (repeated)  10.6 g/dL 12.0 - 15.9 24 0250       11.0 g/dL 12.0 - 15.9 24 1252       11.6 g/dL 12.0 - 15.9 24 1157    Hematocrit (repeated)  35.0 % 34.0 - 46.6 24 0250        35.3 % 34.0 - 46.6 24 1252       35.8 % 34.0 - 46.6 24 1157    Platelets   239 10*3/mm3 140 - 450 24 0250       244 10*3/mm3 140 - 450 24 1252       318 10*3/mm3 140 - 450 24 1157       250 10*3/mm3 140 - 450 10/23/23 1443    GCT  106 mg/dL 65 - 139 24 1157    Antibody Screen (repeated)  Negative   24 1157    3rd TM syphilis scrn (repeated)  RPR         3rd TM syphilis scrn (repeated) FTA        GTT Fasting        GTT 1 Hr        GTT 2 Hr        GTT 3 Hr        Group B Strep                  Other testing        Test Value Reference Range Date Time    Parvo IgG         CMV IgG                   Drug Screening       Test Value Reference Range Date Time    Amphetamine Screen  Negative  Negative 10/23/23 1443    Barbiturate Screen  Negative  Negative 10/23/23 1443    Benzodiazepine Screen  Negative  Negative 10/23/23 1443    Methadone Screen  Negative  Negative 10/23/23 1443    Phencyclidine Screen  Negative  Negative 10/23/23 1443    Opiates Screen  Negative  Negative 10/23/23 1443    THC Screen  Negative  Negative 10/23/23 1443    Cocaine Screen  Negative  Negative 10/23/23 1443    Propoxyphene Screen  Negative  Negative 10/23/23 1443    Buprenorphine Screen  Negative  Negative 10/23/23 1443    Methamphetamine Screen  Negative  Negative 10/23/23 1443    Oxycodone Screen  Negative  Negative 10/23/23 1443    Tricyclic Antidepressants Screen  Negative  Negative 10/23/23 1443              Legend    ^: Historical                               Past OB History:       OB History    Para Term  AB Living   2 1 1 0 0 1   SAB IAB Ectopic Molar Multiple Live Births   0 0 0 0 0 1      # Outcome Date GA Lbr David/2nd Weight Sex Type Anes PTL Lv   2 Current            1 Term 21 39w3d  3827 g (8 lb 7 oz) F Vag-Vacuum EPI  DUY       Past Medical History: Past Medical History:   Diagnosis Date    Herpes 2019      Past Surgical History No past surgical history on file.   Family  History: No family history on file.   Social History:  reports that she has never smoked. She has never been exposed to tobacco smoke. She has never used smokeless tobacco.   reports that she does not currently use alcohol.   reports no history of drug use.        REVIEW OF SYSTEMS              Reports fetal movement is normal             Denies leakage of amniotic fluid.             Denies vaginal bleeding             She reports No contractions, Regular contractions, frequency: Every 2 minutes, intensity strong             All other systems reviewed and are negative    Objective       Weight: Weight:  [66.2 kg (146 lb)] 66.2 kg (146 lb)       Presentation: vertex   Cervix: Exam by: Method: sterile exam per RN   Dilation: Cervical Dilation (cm): 8-9   Effacement: Cervical Effacement: 90%   Station: Fetal Station: -1        Fetal Heart Rate Assessment   Method:  external   Beats/min:  130-160   Baseline:  140   Varibility:  present   Accels:  present   Decels:  Occasional variables   Tracing Category:  Category 1     Uterine Assessment   Method:  external   Frequency (min):  q2   Ctx Count in 10 min:     Duration:  60   Intensity:  strong   Intensity by IUPC:     Resting Tone:     Resting Tone by IUPC:     [place] Units:         Constitutional:  Well developed, well nourished, no acute distress, well-groomed.   Respiratory:  Lungs are clear to auscultation bilaterally, normal breath sounds.   Cardiovascular:  Normal rate and rhythm, no murmurs.   Gastrointestinal:  Soft, gravid, nontender.  Uterus: Soft, nontender. Fundus appropriate for dates.  Neurologic:  Alert & oriented x 3,  no focal deficits noted.   Psychiatric:  Speech and behavior appropriate.   Extremities: no cyanosis, clubbing or edema, no evidence of DVT.        Labs:  Lab Results   Component Value Date    WBC 9.70 05/01/2024    HGB 10.6 (L) 05/01/2024    HCT 35.0 05/01/2024    MCV 75.1 (L) 05/01/2024     05/01/2024    CREATININE 0.43 (L)  "03/29/2024    URICACID 3.9 03/29/2024    AST 16 03/29/2024    ALT 7 03/29/2024     (H) 03/29/2024               Assessment & Plan       Pregnancy        Assessment:   IUP at 39w4d weeks gestation with reactive fetal status.    2.   term labor  without ROM  3.   Obstetrical history significant for is non-contributory.  4.   GBS status: No results found for: \"STREPGPB\"    5.   Rh: positive    Plan:  Expectant management  Continuous FHT and TOCO monitoring.   Diet: NPO  Desires epidural for anesthesia. Getting epidural now  Plan of care has been reviewed with patient and questions answered.  Risks, benefits of treatment plan have been discussed.   Consent obtained to proceed with labor management        Hiral Ahuja CNM  5/1/2024  03:12 EDT  "

## 2024-05-01 NOTE — ANESTHESIA PROCEDURE NOTES
Labor Epidural      Patient reassessed immediately prior to procedure    Patient location during procedure: OB  Performed By  Anesthesiologist: Felicity Singh DO  Preanesthetic Checklist  Completed: patient identified, IV checked, risks and benefits discussed, surgical consent, monitors and equipment checked, pre-op evaluation and timeout performed  Additional Notes  CSE performed using 25g Memo  Prep:  Pt Position:sitting  Sterile Tech:cap, gloves, mask and sterile barrier  Prep:chlorhexidine gluconate and isopropyl alcohol  Monitoring:blood pressure monitoring  Epidural Block Procedure:  Approach:midline  Guidance:palpation technique  Location:L3-L4  Needle Type:Tuohy  Needle Gauge:17 G  Loss of Resistance Medium: air  Loss of Resistance: 4cm  Cath Depth at skin:11 cm  Paresthesia: none  Aspiration:negative  Test Dose:negative  Number of Attempts: 1  Post Assessment:  Dressing:occlusive dressing applied and secured with tape  Pt Tolerance:patient tolerated the procedure well with no apparent complications  Complications:no

## 2024-05-01 NOTE — L&D DELIVERY NOTE
[place]  Vaginal Delivery Note  05/01/24  05:29 EDT      Delivery     Delivery: Vaginal, Spontaneous     YOB: 2024    Time of Birth: 5:04 AM   39w4d      Anesthesia: Epidural     Delivering clinician:  Hiral Ahuja CNM               Delivery narrative:    Patient at 39w4d pushed to deliver a viable male infant over a 1st degree laceration with epidural anesthesia. Infant's head, anterior shoulder, and body delivered atraumatically. Vigorous infant was placed on mom's abdomen where the cord was allowed to stop pulsating and was clamped x2 and cut by FOB. Placenta delivered spontaneously and appeared to be intact. Pitocin to IV after delivery of baby.. Laceration repaired with 3-0 Vicryl. . Mother and infant stable, bonding      Infant    Findings: male  infant     Infant observations: Weight: 9-11  Length:   in  Observations/Comments:        Apgars: 8  @ 1 minute /    9  @ 5 minutes         Placenta, Cord, and Fluid    Placenta delivered  Spontaneous  at  5/1/2024  5:10 AM     Cord:   present.   Nuchal Cord?  yes; Number of nuchal loops present:   1   Cord blood obtained: Yes    Cord gases obtained:  No              Repair    Episiotomy: No       Estimated Blood Loss:  400 mls.   Suture used for repair: 3-0 vicryl     Complications  none    Disposition  Mother to Mother Baby/Postpartum  in stable condition currently.  Baby to remains with mom  in stable condition currently.      Hiral Ahuja CNM  05/01/24  05:29 EDT

## 2024-05-01 NOTE — PROGRESS NOTES
5/1/2024  PPD #0    Subjective   Gianna feels well.  Patient describes her lochia less than menses.  Pain is well controlled.  She is breastfeeding.      Objective   Temp: Temp:  [98.4 °F (36.9 °C)-99.2 °F (37.3 °C)] 98.4 °F (36.9 °C) Temp src: Oral   BP: BP: (107-120)/(59-77) 109/67        Pulse: Heart Rate:  [] 63  RR: Resp:  [16-20] 18    General:  No acute distress   Abdomen: Fundus firm and beneath umbilicus   Pelvis: deferred     Lab Results   Component Value Date    WBC 9.70 05/01/2024    HGB 10.6 (L) 05/01/2024    HCT 35.0 05/01/2024    MCV 75.1 (L) 05/01/2024     05/01/2024    CREATININE 0.50 (L) 05/01/2024    URICACID 4.6 05/01/2024    AST 18 05/01/2024    ALT 6 05/01/2024     (H) 05/01/2024     Results from last 7 days   Lab Units 05/01/24  0319   ABO TYPING  O   RH TYPING  Positive   ANTIBODY SCREEN  Negative       Assessment  PPD# 0 after vaginal delivery    Plan  Routine postpartum care.      This note has been electronically signed.    Layla Head CNM  08:45 EDT  May 1, 2024

## 2024-05-02 VITALS
TEMPERATURE: 98 F | RESPIRATION RATE: 18 BRPM | DIASTOLIC BLOOD PRESSURE: 60 MMHG | OXYGEN SATURATION: 97 % | BODY MASS INDEX: 27.56 KG/M2 | WEIGHT: 146 LBS | HEART RATE: 82 BPM | SYSTOLIC BLOOD PRESSURE: 103 MMHG | HEIGHT: 61 IN

## 2024-05-02 PROBLEM — Z34.90 TERM PREGNANCY: Status: RESOLVED | Noted: 2024-05-01 | Resolved: 2024-05-02

## 2024-05-02 LAB
BASOPHILS # BLD AUTO: 0.03 10*3/MM3 (ref 0–0.2)
BASOPHILS NFR BLD AUTO: 0.3 % (ref 0–1.5)
DEPRECATED RDW RBC AUTO: 47.1 FL (ref 37–54)
EOSINOPHIL # BLD AUTO: 0.2 10*3/MM3 (ref 0–0.4)
EOSINOPHIL NFR BLD AUTO: 1.8 % (ref 0.3–6.2)
ERYTHROCYTE [DISTWIDTH] IN BLOOD BY AUTOMATED COUNT: 17.2 % (ref 12.3–15.4)
HCT VFR BLD AUTO: 27.9 % (ref 34–46.6)
HGB BLD-MCNC: 8.4 G/DL (ref 12–15.9)
IMM GRANULOCYTES # BLD AUTO: 0.14 10*3/MM3 (ref 0–0.05)
IMM GRANULOCYTES NFR BLD AUTO: 1.3 % (ref 0–0.5)
LYMPHOCYTES # BLD AUTO: 3.34 10*3/MM3 (ref 0.7–3.1)
LYMPHOCYTES NFR BLD AUTO: 30.7 % (ref 19.6–45.3)
MCH RBC QN AUTO: 23 PG (ref 26.6–33)
MCHC RBC AUTO-ENTMCNC: 30.1 G/DL (ref 31.5–35.7)
MCV RBC AUTO: 76.2 FL (ref 79–97)
MONOCYTES # BLD AUTO: 0.68 10*3/MM3 (ref 0.1–0.9)
MONOCYTES NFR BLD AUTO: 6.3 % (ref 5–12)
NEUTROPHILS NFR BLD AUTO: 59.6 % (ref 42.7–76)
NEUTROPHILS NFR BLD AUTO: 6.49 10*3/MM3 (ref 1.7–7)
NRBC BLD AUTO-RTO: 0.6 /100 WBC (ref 0–0.2)
PLATELET # BLD AUTO: 190 10*3/MM3 (ref 140–450)
PMV BLD AUTO: 9.7 FL (ref 6–12)
RBC # BLD AUTO: 3.66 10*6/MM3 (ref 3.77–5.28)
WBC NRBC COR # BLD AUTO: 10.88 10*3/MM3 (ref 3.4–10.8)

## 2024-05-02 PROCEDURE — 85025 COMPLETE CBC W/AUTO DIFF WBC: CPT | Performed by: ADVANCED PRACTICE MIDWIFE

## 2024-05-02 RX ORDER — PSEUDOEPHEDRINE HCL 30 MG
100 TABLET ORAL 2 TIMES DAILY PRN
Qty: 60 CAPSULE | Refills: 1 | Status: SHIPPED | OUTPATIENT
Start: 2024-05-02

## 2024-05-02 RX ORDER — FERROUS SULFATE 325(65) MG
325 TABLET ORAL
Qty: 30 TABLET | Refills: 1 | Status: SHIPPED | OUTPATIENT
Start: 2024-05-03

## 2024-05-02 RX ORDER — IBUPROFEN 600 MG/1
600 TABLET ORAL EVERY 6 HOURS PRN
Qty: 60 TABLET | Refills: 1 | Status: SHIPPED | OUTPATIENT
Start: 2024-05-02

## 2024-05-02 RX ORDER — FERROUS SULFATE 325(65) MG
325 TABLET ORAL
Status: DISCONTINUED | OUTPATIENT
Start: 2024-05-02 | End: 2024-05-02 | Stop reason: HOSPADM

## 2024-05-02 RX ADMIN — DOCUSATE SODIUM 100 MG: 100 CAPSULE, LIQUID FILLED ORAL at 10:30

## 2024-05-02 RX ADMIN — FERROUS SULFATE TAB 325 MG (65 MG ELEMENTAL FE) 325 MG: 325 (65 FE) TAB at 10:30

## 2024-05-02 RX ADMIN — IBUPROFEN 600 MG: 600 TABLET, FILM COATED ORAL at 10:30

## 2024-05-02 NOTE — ANESTHESIA POSTPROCEDURE EVALUATION
Patient: Gianna Harris    Procedure Summary       Date: 05/01/24 Room / Location:     Anesthesia Start: 0302 Anesthesia Stop: 0510    Procedure: LABOR ANALGESIA Diagnosis:     Scheduled Providers:  Provider: Felicity Singh DO    Anesthesia Type: epidural ASA Status: 2            Anesthesia Type: epidural    Vitals  Vitals Value Taken Time   /60 05/02/24 0905   Temp 98 °F (36.7 °C) 05/02/24 0905   Pulse 82 05/02/24 0905   Resp 18 05/02/24 0905   SpO2 97 % 05/01/24 2300           Post Anesthesia Care and Evaluation    Patient location during evaluation: bedside  Patient participation: complete - patient participated  Level of consciousness: awake and alert  Pain management: adequate    Airway patency: patent  Anesthetic complications: No anesthetic complications    Cardiovascular status: acceptable  Respiratory status: acceptable  Hydration status: acceptable  Post Neuraxial Block status: Motor and sensory function returned to baseline and No signs or symptoms of PDPH

## 2024-05-02 NOTE — DISCHARGE SUMMARY
[facility]  Vaginal delivery discharge summary      Patient: Gianna Harris      MR#:[id number]  Admission  Diagnosis: Present on Admission:   Postpartum anemia      Discharge Diagnosis: Active and Resolved Problems  Active Hospital Problems    Diagnosis  POA    Postpartum anemia [O90.81]  Yes     (normal spontaneous vaginal delivery) [O80]  Not Applicable      Resolved Hospital Problems    Diagnosis Date Resolved POA    **Pregnancy [Z34.90] 2024 Not Applicable    Term pregnancy [Z34.90] 2024 Not Applicable            Date of Admission: 2024  Date of Discharge:  2024    Procedures:  Vaginal, Spontaneous     2024    5:04 AM      Service:  Obstetrics    Hospital Course:  Patient underwent vaginal delivery and remained in the hospital for 1 days.  During that time she remained afebrile and hemodynamically stable.  On the day of discharge, she was eating, ambulating and voiding without difficulty.  She is breastfeeding.     Labs:    Lab Results   Component Value Date    WBC 10.88 (H) 2024    HGB 8.4 (L) 2024    HCT 27.9 (L) 2024    MCV 76.2 (L) 2024     2024    CREATININE 0.50 (L) 2024    URICACID 4.6 2024    AST 18 2024    ALT 6 2024     (H) 2024     Results from last 7 days   Lab Units 24  0319   ABO TYPING  O   RH TYPING  Positive   ANTIBODY SCREEN  Negative            Discharge Medications        New Medications        Instructions Start Date   docusate sodium 100 MG capsule   100 mg, Oral, 2 Times Daily PRN      ferrous sulfate 325 (65 FE) MG tablet   325 mg, Oral, Daily With Breakfast   Start Date: May 3, 2024     ibuprofen 600 MG tablet  Commonly known as: ADVIL,MOTRIN   600 mg, Oral, Every 6 Hours PRN             Stop These Medications      valACYclovir 500 MG tablet  Commonly known as: VALTREX              Discharge Disposition:  To Home    Discharge Condition:  Stable. PP anemia taking ferrous  sulfate.     Discharge Diet: Regular    Activity at Discharge: Pelvic rest    Follow-up Appointments  Follow up with LW in 6 weeks.     Layla Head CNM  05/02/24  11:54 EDT

## 2024-05-02 NOTE — PROGRESS NOTES
5/2/2024  PPD #1    Subjective   Gianna feels well.  Patient describes her lochia as less than menses.  Pain is well controlled  She is breastfeeding.  She would like discharge today.     Objective   Temp: Temp:  [97.9 °F (36.6 °C)-98.9 °F (37.2 °C)] 98.9 °F (37.2 °C) Temp src: Oral   BP: BP: ()/(54-70) 92/54        Pulse: Heart Rate:  [] 88  RR: Resp:  [18] 18    General:  No acute distress   Abdomen: Fundus firm and beneath umbilicus   Pelvis: deferred     Lab Results   Component Value Date    WBC 10.88 (H) 05/02/2024    HGB 8.4 (L) 05/02/2024    HCT 27.9 (L) 05/02/2024    MCV 76.2 (L) 05/02/2024     05/02/2024    CREATININE 0.50 (L) 05/01/2024    URICACID 4.6 05/01/2024    AST 18 05/01/2024    ALT 6 05/01/2024     (H) 05/01/2024    HEPBSAG Non-Reactive 10/23/2023     Results from last 7 days   Lab Units 05/01/24  0319   ABO TYPING  O   RH TYPING  Positive   ANTIBODY SCREEN  Negative       Assessment  PPD# 1 after vaginal delivery  2.   Anemia, POA    Plan  Supportive care, anticipate d/c in am.  2.   Ferrous sulfate 325mg PO daily.     This note has been electronically signed.    Layla Head CNM  08:28 EDT  May 2, 2024

## 2024-05-14 ENCOUNTER — MATERNAL SCREENING (OUTPATIENT)
Dept: CALL CENTER | Facility: HOSPITAL | Age: 24
End: 2024-05-14
Payer: COMMERCIAL

## 2024-05-14 NOTE — OUTREACH NOTE
Maternal Screening Survey      Flowsheet Row Responses   Facility patient discharged from? [facility]   Attempt successful? No   Unsuccessful attempts Attempt 2              Jazlyn REY - Registered Nurse

## 2024-05-14 NOTE — OUTREACH NOTE
Maternal Screening Survey      Flowsheet Row Responses   Facility patient discharged from? [facility]   Attempt successful? No   Unsuccessful attempts Attempt 1              Jazlyn REY - Registered Nurse

## 2024-05-15 ENCOUNTER — MATERNAL SCREENING (OUTPATIENT)
Dept: CALL CENTER | Facility: HOSPITAL | Age: 24
End: 2024-05-15
Payer: COMMERCIAL

## 2024-05-15 NOTE — OUTREACH NOTE
Maternal Screening Survey      Flowsheet Row Responses   Facility patient discharged from? [facility]   Attempt successful? Yes   Call start time 1345   Call end time 1348   EPD Scale: Able to Laugh 0-->as much as she always could   EPD Scale: Looked Forward 0-->as much as she ever did   EPD Scale: Blamed Self 0-->no, never   EPD Scale: Been Anxious 0-->no, not at all   EPD Scale: Felt Panicky 0-->no, not at all   EPD Scale: Things Getting on Top 0-->no, has been coping as well as ever   EPD Scale: Difficulty Sleeping 0-->no, not at all   EPD Scale: Sad or Miserable 0-->no, not at all   EPD Scale: Crying 0-->no, never   EPD Scale: Thought of Harming Self 0-->never   [place]  Depression Score 0   Did any of your parents have problems with alcohol or drug use? No   Do any of your peers have problems with alcohol or drug use? No   Does your partner have problems with alcohol or drug use? No   Before you were pregnant did you have problems with alcohol or drug use? (past) No   In the past month, did you drink beer, wine, liquor or use any other drugs? (pregnancy) No   Maternal Screening call completed Yes   Call end time 1348              Jazlyn REY - Registered Nurse